# Patient Record
Sex: FEMALE | Race: WHITE
[De-identification: names, ages, dates, MRNs, and addresses within clinical notes are randomized per-mention and may not be internally consistent; named-entity substitution may affect disease eponyms.]

---

## 2018-06-04 ENCOUNTER — HOSPITAL ENCOUNTER (INPATIENT)
Dept: HOSPITAL 25 - MCHOBOUT | Age: 28
LOS: 4 days | Discharge: HOME | DRG: 560 | End: 2018-06-08
Attending: MIDWIFE | Admitting: MIDWIFE
Payer: COMMERCIAL

## 2018-06-04 DIAGNOSIS — Z3A.39: ICD-10-CM

## 2018-06-04 PROCEDURE — 36415 COLL VENOUS BLD VENIPUNCTURE: CPT

## 2018-06-04 PROCEDURE — 86901 BLOOD TYPING SEROLOGIC RH(D): CPT

## 2018-06-04 PROCEDURE — S0191 MISOPROSTOL, ORAL, 200 MCG: HCPCS

## 2018-06-04 PROCEDURE — 86900 BLOOD TYPING SEROLOGIC ABO: CPT

## 2018-06-04 PROCEDURE — 85025 COMPLETE CBC W/AUTO DIFF WBC: CPT

## 2018-06-04 PROCEDURE — 84112 EVAL AMNIOTIC FLUID PROTEIN: CPT

## 2018-06-04 PROCEDURE — 85461 HEMOGLOBIN FETAL: CPT

## 2018-06-04 PROCEDURE — 86850 RBC ANTIBODY SCREEN: CPT

## 2018-06-05 LAB
BASOPHILS # BLD AUTO: 0.1 10^3/UL (ref 0–0.2)
EOSINOPHIL # BLD AUTO: 0.1 10^3/UL (ref 0–0.6)
HCT VFR BLD AUTO: 34 % (ref 35–47)
HGB BLD-MCNC: 11.9 G/DL (ref 12–16)
LYMPHOCYTES # BLD AUTO: 2.3 10^3/UL (ref 1–4.8)
MCH RBC QN AUTO: 31 PG (ref 27–31)
MCHC RBC AUTO-ENTMCNC: 35 G/DL (ref 31–36)
MCV RBC AUTO: 90 FL (ref 80–97)
MONOCYTES # BLD AUTO: 0.9 10^3/UL (ref 0–0.8)
NEUTROPHILS # BLD AUTO: 10.1 10^3/UL (ref 1.5–7.7)
NRBC # BLD AUTO: 0 10^3/UL
NRBC BLD QL AUTO: 0.1
PLATELET # BLD AUTO: 203 10^3/UL (ref 150–450)
RBC # BLD AUTO: 3.85 10^6/UL (ref 4–5.4)
WBC # BLD AUTO: 13.5 10^3/UL (ref 3.5–10.8)

## 2018-06-05 RX ADMIN — PENICILLIN G POTASSIUM SCH MLS/HR: 20000000 POWDER, FOR SOLUTION INTRAVENOUS at 11:46

## 2018-06-05 RX ADMIN — PENICILLIN G POTASSIUM SCH: 20000000 POWDER, FOR SOLUTION INTRAVENOUS at 16:05

## 2018-06-05 RX ADMIN — PENICILLIN G POTASSIUM SCH MLS/HR: 20000000 POWDER, FOR SOLUTION INTRAVENOUS at 16:00

## 2018-06-05 RX ADMIN — PENICILLIN G POTASSIUM SCH MLS/HR: 20000000 POWDER, FOR SOLUTION INTRAVENOUS at 06:13

## 2018-06-05 RX ADMIN — PENICILLIN G POTASSIUM SCH MLS/HR: 20000000 POWDER, FOR SOLUTION INTRAVENOUS at 20:20

## 2018-06-05 NOTE — RAD
HISTORY: History of DVT, leg pain



COMPARISONS: None relevant



TECHNIQUE: Multiple transverse and longitudinal ultrasound images were obtained of the

right lower extremity from the level of the common femoral vein inferiorly through to the

infrapopliteal veins  using grayscale, color Doppler, and spectral Doppler imaging with

and without compression and with augmentation. Comparison images were obtained of the

contralateral common femoral vein.



FINDINGS:

VEINS: The venous system of the right lower extremity is compressible throughout its

course, with normal flow on color Doppler imaging and normal response to augmentation on

spectral Doppler imaging.



SOFT TISSUES: Unremarkable.



OTHER FINDINGS: None.



IMPRESSION: 

NO RIGHT LOWER EXTREMITY DEEP VEIN THROMBOSIS

## 2018-06-05 NOTE — HP
General Information





- General Information


Maternal Age: 27


Grav: 3


Para: 1


SAB: 1


IEA: 0





Estimated Due Date: 18


Determined By: LMP


Gestational Age in Weeks and Days: 39 Weeks and 4 Days


Maternal Blood Type and Rh: O Negative





- Results this Pregnancy


Serology/RPR Result: Non-Reactive


Rubella Result: Immune


HBsAg Result: Negative


HIV Result: Negative


GBS Culture Result: Positive





Past Medical History


Delivery History: Hx Uncomplicated Vaginal Delivery - delivery uncomplicated, 

but developed postpartum DVT


Pertinent Past Medical History: See  Records - DVT


Pertinent Past Surgical History: None


Pertinent Family History: Non-Contributory





- Antepartal Records


Antepartal Records: Reviewed, Pregnancy Uncomplicated - GBS positive, Rh 

negative





Review of Systems


Constitutional: Comfortable


CV Complaint: No


Respiratory: Shortness of Breath: No


Gastrointestinal: No Nausea/Vomiting


Genitourinary: Leaking Fluid, No Dysuria, No Bleeding


Musculoskeletal: No Complaint


Neurological: No Headache


Fetal Movement: Normal





Exam


Allergies/Adverse Reactions: 


Allergies





bupropion Allergy (Verified 18 01:41)


 Joint Pain











T-98.6, P-97, R-18, /68, O2-100%


Lab Values - Entire Visit: 


 Laboratory Tests











  18





  00:20 01:21 01:21


 


WBC    13.5 H


 


RBC    3.85 L


 


Hgb    11.9 L


 


Hct    34 L


 


MCV    90


 


MCH    31


 


MCHC    35


 


RDW    13


 


Plt Count    203


 


MPV    8.7


 


Neut % (Auto)    74.7


 


Lymph % (Auto)    17.4 L


 


Mono % (Auto)    6.5


 


Eos % (Auto)    1.0


 


Baso % (Auto)    0.4


 


Absolute Neuts (auto)    10.1 H


 


Absolute Lymphs (auto)    2.3


 


Absolute Monos (auto)    0.9 H


 


Absolute Eos (auto)    0.1


 


Absolute Basos (auto)    0.1


 


Absolute Nucleated RBC    0


 


Nucleated RBC %    0.1


 


Vag Amniotic Fld Detect  Positive  


 


Blood Type   O Negative 


 


Antibody Screen   Negative 














- Measurements


Height: 5 ft 9 in


Weight: 90.718 kg


Weight in lbs: 200


Body Mass Index (BMI): 29.5


Pre-Pregnancy Weight: 68.039 kg


Weight Gained This Pregnancy: 50 lbs and 0 ozs





- Exam


Abdomen: No Upper Quadrant Pain


Breast: Breast Exam Deferred


CVA: No CVA Tenderness


Extremities: No Edema


Heart: Normal Rhythm/Heart Sounds


HEENT: No Significant Findings


Lungs: Clear Bilaterally


Rectal: Rectal Exam Deferred


Reflexes: DTR 2+


Thyroid: No Thyromegaly





- Abdominal Exam


Abdomen Exam: Non-Tender, Fundal Height Consistent with Dates





- Ultrasound/Biophysical Profile


Ultrasound Status: Not Done





Targeted Exam Findings


See L&D Outpatient Visit Provider Note for Findings: N/A


Estimated Fetal Weight: 7.5#


Membrane Status: SROM


Amniotic Fluid Evaluation: Gross Rupture, Positive ROM Plus


Bleeding/Discharge: None





EFM Findings





- External Fetal Monitor Findings


Baseline Fetal Heart Rate: 145


External Fetal Monitor Findings: Accelerations Present, No Pattern of Variable 

or Late Decelerations, Variability Moderate, Baseline Stable


Contractions: Irregular, Mild





Assessment/Plan





- Obstetrical Risk Factors


Obstetrical Risk Factors: GBS Positive





- Plan


Plan: Observe - offered augmentation vs expectant management, pt prefers 

expectant management

## 2018-06-06 PROCEDURE — 0HQ9XZZ REPAIR PERINEUM SKIN, EXTERNAL APPROACH: ICD-10-PCS | Performed by: MIDWIFE

## 2018-06-06 RX ADMIN — PENICILLIN G POTASSIUM SCH MLS/HR: 20000000 POWDER, FOR SOLUTION INTRAVENOUS at 01:59

## 2018-06-06 RX ADMIN — PENICILLIN G POTASSIUM SCH MLS/HR: 20000000 POWDER, FOR SOLUTION INTRAVENOUS at 14:40

## 2018-06-06 RX ADMIN — PENICILLIN G POTASSIUM SCH MLS/HR: 20000000 POWDER, FOR SOLUTION INTRAVENOUS at 18:17

## 2018-06-06 RX ADMIN — PENICILLIN G POTASSIUM SCH MLS/HR: 20000000 POWDER, FOR SOLUTION INTRAVENOUS at 10:26

## 2018-06-06 RX ADMIN — PENICILLIN G POTASSIUM SCH MLS/HR: 20000000 POWDER, FOR SOLUTION INTRAVENOUS at 06:20

## 2018-06-07 LAB
BASOPHILS # BLD AUTO: 0.4 10^3/UL (ref 0–0.2)
EOSINOPHIL # BLD AUTO: 0 10^3/UL (ref 0–0.6)
HCT VFR BLD AUTO: 33 % (ref 35–47)
HGB BLD-MCNC: 11.5 G/DL (ref 12–16)
LYMPHOCYTES # BLD AUTO: 1.7 10^3/UL (ref 1–4.8)
MCH RBC QN AUTO: 31 PG (ref 27–31)
MCHC RBC AUTO-ENTMCNC: 35 G/DL (ref 31–36)
MCV RBC AUTO: 90 FL (ref 80–97)
MONOCYTES # BLD AUTO: 0.8 10^3/UL (ref 0–0.8)
NEUTROPHILS # BLD AUTO: 15.4 10^3/UL (ref 1.5–7.7)
NRBC # BLD AUTO: 0 10^3/UL
NRBC BLD QL AUTO: 0
PLATELET # BLD AUTO: 207 10^3/UL (ref 150–450)
RBC # BLD AUTO: 3.66 10^6/UL (ref 4–5.4)
WBC # BLD AUTO: 18.4 10^3/UL (ref 3.5–10.8)

## 2018-06-07 RX ADMIN — DOCUSATE SODIUM SCH: 100 CAPSULE, LIQUID FILLED ORAL at 15:16

## 2018-06-07 RX ADMIN — DOCUSATE SODIUM SCH: 100 CAPSULE, LIQUID FILLED ORAL at 19:31

## 2018-06-07 RX ADMIN — WITCH HAZEL PRN PAD: 5 CLOTH TOPICAL at 08:06

## 2018-06-08 VITALS — SYSTOLIC BLOOD PRESSURE: 114 MMHG | DIASTOLIC BLOOD PRESSURE: 65 MMHG

## 2018-06-08 RX ADMIN — WITCH HAZEL PRN PAD: 5 CLOTH TOPICAL at 08:35
